# Patient Record
Sex: FEMALE | Race: WHITE
[De-identification: names, ages, dates, MRNs, and addresses within clinical notes are randomized per-mention and may not be internally consistent; named-entity substitution may affect disease eponyms.]

---

## 2018-03-13 ENCOUNTER — HOSPITAL ENCOUNTER (EMERGENCY)
Dept: HOSPITAL 52 - LL.ED | Age: 83
Discharge: NURSING, REHAB OR LONG-TERM CARE | End: 2018-03-13
Payer: MEDICARE

## 2018-03-13 VITALS — DIASTOLIC BLOOD PRESSURE: 79 MMHG | SYSTOLIC BLOOD PRESSURE: 135 MMHG

## 2018-03-13 DIAGNOSIS — M54.9: ICD-10-CM

## 2018-03-13 DIAGNOSIS — Z88.8: ICD-10-CM

## 2018-03-13 DIAGNOSIS — N18.9: ICD-10-CM

## 2018-03-13 DIAGNOSIS — S40.022A: Primary | ICD-10-CM

## 2018-03-13 DIAGNOSIS — J44.9: ICD-10-CM
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
## 2018-03-13 DIAGNOSIS — M25.552: ICD-10-CM

## 2018-03-13 DIAGNOSIS — I25.2: ICD-10-CM

## 2018-03-13 DIAGNOSIS — I50.9: ICD-10-CM

## 2018-03-13 DIAGNOSIS — I13.0: ICD-10-CM

## 2018-03-13 DIAGNOSIS — Z79.899: ICD-10-CM

## 2018-03-13 PROCEDURE — 99284 EMERGENCY DEPT VISIT MOD MDM: CPT

## 2018-03-13 PROCEDURE — 72070 X-RAY EXAM THORAC SPINE 2VWS: CPT

## 2018-03-13 PROCEDURE — 72100 X-RAY EXAM L-S SPINE 2/3 VWS: CPT

## 2018-03-13 PROCEDURE — 73502 X-RAY EXAM HIP UNI 2-3 VIEWS: CPT

## 2018-03-13 NOTE — EDM.PDOC
ED HPI GENERAL MEDICAL PROBLEM





- General


Chief Complaint: General


Stated Complaint: Fall with low back pain


Time Seen by Provider: 03/13/18 14:44


Source of Information: Reports: Patient, Other ([place])


History Limitations: Reports: No Limitations





- History of Present Illness


INITIAL COMMENTS - FREE TEXT/NARRATIVE: 





Patient comes to ER with complaint of mid back pain and left hip discomfort 

after falling onto buttocks this morning while picking out her clothes for the 

day.  Complained of developing a sensation of dizziness which led to the fall.  

Points to lower thoracic/upper lumbar region as area in question. Did not hit 

head. Has small bruise left elbow. No LOC. No other reported new complaints at 

this time. 





History of chronic vertigo. 





No focal acute neuro changes.


No fevers/chills


Denies HEENT changes/CV/Resp/GI/ changes. 


  ** Mid-Back


Pain Score (Numeric/FACES): 10





- Related Data


 Allergies











Allergy/AdvReac Type Severity Reaction Status Date / Time


 


enoxaparin sodium Allergy  Bleeding Verified 08/22/16 14:38





[From Lovenox]     


 


heparin Allergy  Bleeding Verified 08/22/16 14:38


 


NSAIDS (Non-Steroidal Allergy  Cannot Verified 08/22/16 14:38





Anti-Inflamma   Remember  


 


tetracycline [Tetracycline] Allergy  Fainting Verified 08/22/16 14:38











Home Meds: 


 Home Meds





Allopurinol [Zyloprim] 100 mg PO DAILY 05/19/14 [History]


Calcitriol [Rocaltrol] 0.25 mcg PO ASDIRECTED 05/19/14 [History]


Citalopram Hydrobromide [Celexa] 10 mg PO DAILY 05/19/14 [History]


LORazepam [Ativan] 0.25 mg PO BID 05/19/14 [History]


Mirtazapine [Remeron] 15 mg PO BEDTIME 05/19/14 [History]


Nitroglycerin [Nitrostat] 0.4 mg SL ASDIRECTED 05/19/14 [History]


Potassium Chloride [Klor-Con M20] 20 meq PO DAILY 05/19/14 [History]


Simvastatin [Zocor] 40 mg PO BEDTIME 05/19/14 [History]


Acetaminophen [Tylenol] 650 mg PO Q4HR PRN 08/22/16 [History]


Acetaminophen with Codeine [Acetaminophen-Cod #3] 1 tab PO BEDTIME MDD 3000 mg 

Tylenol, 360 mg Codein 08/22/16 [History]


Acetaminophen with Codeine [Acetaminophen-Cod #3] 1 tab PO Q4HR PRN MDD 3000 

Tylenol, 360 codiene 08/22/16 [History]


Bisacodyl [Biscolax] 10 mg RC DAILY PRN 08/22/16 [History]


Bumetanide [Bumex] 1 mg PO BID 08/22/16 [History]


Gabapentin [Neurontin] 100 mg PO BID 08/22/16 [History]


Magnesium Hydroxide [Milk of Magnesia] 30 ml PO DAILY PRN 08/22/16 [History]


Menthol [Cough Drops] 5.4 mg MM ASDIRECTED PRN 08/22/16 [History]


Metoprolol Succinate [Toprol XL] 12.5 mg PO DAILY 08/22/16 [History]


Propylene Glycol/Peg 400 [Systane 0.3-0.4% Eye Drops] 2 drop EYEBOTH QID 08/22/ 16 [History]


Saliva Stimulant Agents Comb.3 [Biotene Moisturizing Mouth Spray] 2 spray MM 

ASDIRECTED PRN 08/22/16 [History]


Sennosides/Docusate Sodium [Senna S Tablet] 1 each PO BID PRN 08/22/16 [History]


Albuterol Sulfate 0.63 mg IH QID PRN 03/13/18 [History]


Albuterol [Proair HFA] 2 puff INH Q4HR PRN 03/13/18 [History]


Calcium Carbonate [Tums] 2 tab PO ASDIRECTED PRN MDD 10 03/13/18 [History]


Cholecalciferol (Vitamin D3) [Vitamin D3] 1,000 unit PO DAILY 03/13/18 [History]


Fluticasone Furoate [Arnuity Ellipta] 200 mcg IH DAILY 03/13/18 [History]


Loperamide HCl [Imodium A-D] 2 mg PO Q3HR PRN MDD 8 03/13/18 [History]


Oxybutynin [Oxybutynin  ER] 10 mg PO DAILY 03/13/18 [History]


Umeclidinium Bromide [Incruse Ellipta] 1 puff INH DAILY 03/13/18 [History]


traMADol HCl [Tramadol HCl] 50 mg PO Q6H PRN #28 tablet 03/13/18 [Rx]











Past Medical History


HEENT History: Reports: Cataract, Other (See Below)


Other HEENT History: Chronic vertigo, bilateral cataract surgery as below


Cardiovascular History: Reports: Aneurysm, Bypass, CAD, Cardiomyopathy, Heart 

Failure, Heart Murmur, High Cholesterol, Hypertension, MI, Pulmonary 

Hypertension, PVD, Stents, Other (See Below)


Other Cardiovascular History: 2 previous aneurysms currently under observation 

protocol, severe ischemic cardiomyopathy with recurrent CHF, dyslipidemia, 

multiple previous non-STEMI MIs including on 10/27/10, December 2011, and 1/5/12

, multiple cardiac surgeries and AAA repair as below


Respiratory History: Reports: COPD


Gastrointestinal History: Reports: Cholelithiasis, Chronic Constipation, 

Gastritis, GI Bleed, PUD, Other (See Below)


Other Gastrointestinal History: Likely upper GI bleed diagnosed on 8/1/14, 

chronic dysphagia


Genitourinary History: Reports: Chronic Renal Insuffiency, UTI, Recurrent


OB/GYN History: Reports: Pregnancy


Musculoskeletal History: Reports: Fracture, Gout, Osteoarthritis, Osteoporosis, 

Other (See Below)


Other Musculoskeletal History: Previous finger fracture


Psychiatric History: Reports: Anxiety, Depression


Endocrine/Metabolic History: Reports: Hyperparathyroidism


Hematologic History: Reports: Anemia


Oncologic (Cancer) History: Reports: Basal Cell Carcinoma, Malignant Melanoma, 

Other (See Below)


Other Oncologic History: Melanoma of the nose and ears per [facility] chart 

however [facility] record showed these to be basal cell carcinomas, basal cell of 

the left chin and right Achilles tendon area with additional basal cell 

carcinoma of the right nose in 1999 and right ear in 2006





- Infectious Disease History


Infectious Disease History: Reports: C-Difficile, Chicken Pox, Measles, Mumps, 

Shingles





- Past Surgical History


HEENT Surgical History: Reports: Cataract Surgery, Tonsillectomy, Other (See 

Below)


Cardiovascular Surgical History: Reports: AAA Repair, Coronary Artery Bypass, 

Coronary Artery Stent, Other (See Below)


GI Surgical History: Reports: Cholecystectomy, ERCP, Other (See Below)


Musculoskeletal Surgical History: Reports: ORIF, Other (See Below)


Dermatological Surgical History: Reports: Skin Biopsy





- Past Imaging History


Past Imaging History: Reports: Cardiac Echo, CAT Scan





Social & Family History





- Family History


Cardiac: Reports: CAD, Hypertension, PVD/COD, Other (See Below)


Other Cardiac Family History: Heart disease in 2 brothers and 2 sisters, 

hypertension in father, 5 sisters and mother, sister with renal artery stenosis 

as below


Respiratory: Reports: COPD


: Reports: Renal Disease/Insufficiency, Other (See Below)


Other  Family History: Chronic renal disease in sister including renal artery 

stenosis


Neurological: Reports: CVA, Migraines, Other (See Below)


Other Neurological Family History: Migraine headaches in paternal grandfather, 

CVA in father


Psychiatric: Reports: Anxiety, Depression


Endocrine/Metabolic: Reports: Diabetes, type II, Other (See Below)


Other Endocrine/Metabolic Family History: Diabetes mellitus in son and mother


Oncologic: Reports: Brain, Renal, Other (See Below)


Other Oncologic Family History: Brother with renal cancer, granddaughter with 

brain cancer diagnosed as oligodendroglioma at age 19, unknown type of brain 

cancer in daughter in her 60s





- Tobacco Use


Smoking Status *Q: Never Smoker


Second Hand Smoke Exposure: Yes





- Caffeine Use


Caffeine Use: Reports: Coffee





- Alcohol Use


Days Per Week of Alcohol Use: 0





- Recreational Drug Use


Recreational Drug Use: No


Drug Use in Last 12 Months: No





- Living Situation & Occupation


Living situation: Reports: , Extended Care Facility





ED ROS GENERAL





- Review of Systems


Review Of Systems: ROS reveals no pertinent complaints other than HPI.





ED EXAM, GENERAL





- Physical Exam


Exam: See Below


Exam Limited By: No Limitations


General Appearance: Alert, WD/WN, No Apparent Distress


Eye Exam: Bilateral Eye: EOMI, PERRL


Ears: Normal External Exam


Nose: No: Nasal Deformity, Nasal Swelling, Nasal Drainage


Throat/Mouth: Normal Inspection, Normal Voice, No Airway Compromise


Head: Atraumatic, Normocephalic


Neck: Normal Inspection, Supple, Non-Tender, Full Range of Motion


Respiratory/Chest: No Respiratory Distress, Lungs Clear, Normal Breath Sounds, 

No Accessory Muscle Use, Chest Non-Tender


Cardiovascular: Normal Peripheral Pulses, Regular Rate, Rhythm


Peripheral Pulses: 2+: Radial (L), Radial (R)


GI/Abdominal: Normal Bowel Sounds, Soft, Non-Tender, No Distention


 (Female) Exam: Deferred


Rectal (Female) Exam: Deferred


Back Exam: Other (mild tenderness noted palpation of thoraco-lumbar junction 

and adjacent soft tissue on right. )


Extremities: Normal Range of Motion, No Pedal Edema, Normal Capillary Refill, 

Other (mild tenderness left lateral hip. No bruising noted. ).  No: Joint 

Swelling, Arm Pain


Neurological: Alert, Oriented, Normal Cognition, Other (equal  upper 

extremities. Equal strength lower extremities. Able to lift legs in air 

bilaterally without obvious discomfort. )


Psychiatric: Normal Affect, Normal Mood


Skin Exam: Warm, Dry, Intact, Ecchymosis (left elbow. )





Course





- Vital Signs


Last Recorded V/S: 


 Last Vital Signs











Temp  36.6 C   03/13/18 14:35


 


Pulse  75   03/13/18 14:35


 


Resp  18   03/13/18 14:35


 


BP  135/79   03/13/18 14:35


 


Pulse Ox  92 L  03/13/18 14:35














- Orders/Labs/Meds


Orders: 


 Active Orders 24 hr











 Category Date Time Status


 


 Hip Min 2V or 3V w Pelvis Lt [CR] Stat Exams  03/13/18 15:25 Taken


 


 Lumbar Spine 2 or 3V [CR] Stat Exams  03/13/18 14:44 Taken


 


 Thoracic Spine 2V [CR] Stat Exams  03/13/18 14:44 Taken











Meds: 


Medications














Discontinued Medications














Generic Name Dose Route Start Last Admin





  Trade Name Freq  PRN Reason Stop Dose Admin


 


Tramadol HCl  50 mg  03/13/18 14:45  03/13/18 15:23





  Ultram  PO  03/13/18 14:46  50 mg





  ONETIME ONE   Administration














- Radiology Interpretation


Free Text/Narrative:: 





Xrays of thoracic and lumbar spine ordered to look for compression fracture. 

Pelvis and hip films ordered. Radiology did not note any acute changes 

suggestive of new compression fracture or other acute bony injury. 





- Re-Assessments/Exams


Free Text/Narrative Re-Assessment/Exam: 





03/13/18 17:08


Patient able to stand with assist, had weight equally on both extremities. 

Overall appeared comfortable throughout stay.  Given Tramadol PO early on. 

Rated discomfort a "5" when standing.  





No obvious acute fracture noted. Conservative treatment plan at this time. Ice/

gentle activity/PRN Tramadol.  To watch for changes. To follow up with primary 

as needed if no improvement is noted within the next week or if there are 

sudden worsening changes.  Radiology recommended to consider MRI if additional 

imaging is thought to be required. 





Vital signs stable.  No other complaints during stay in ER. 





Departure





- Departure


Time of Disposition: 16:53


Disposition: DC/Tfer to [facility]


Condition: Good


Clinical Impression: 


 Left hip pain





Fall


Qualifiers:


 Encounter type: initial encounter Qualified Code(s): W19.XXXA - Unspecified 

fall, initial encounter





Back pain


Qualifiers:


 Back pain location: back pain in unspecified location Chronicity: acute Back 

pain laterality: midline Qualified Code(s): M54.9 - Dorsalgia, unspecified





Arm contusion


Qualifiers:


 Encounter type: initial encounter Laterality: left Qualified Code(s): S40.022A 

- Contusion of left upper arm, initial encounter








- Discharge Information


Prescriptions: 


traMADol HCl [Tramadol HCl] 50 mg PO Q6H PRN #28 tablet


 PRN Reason: Pain (Moderate 4-6)


Referrals: 


Miguelito Jean-Baptiste MD [Primary Care Provider] - 


Forms:  ED Department Discharge


Additional Instructions: 


Watch for changes.  If there are any concerns/worsening problems, or if pain is 

not improving within the next week, follow up with primary provider and 

consider obtaining MRI. Tramadol PRN pain. 





- My Orders


Last 24 Hours: 


My Active Orders





03/13/18 14:44


Lumbar Spine 2 or 3V [CR] Stat 


Thoracic Spine 2V [CR] Stat 





03/13/18 15:25


Hip Min 2V or 3V w Pelvis Lt [CR] Stat 














- Assessment/Plan


Last 24 Hours: 


My Active Orders





03/13/18 14:44


Lumbar Spine 2 or 3V [CR] Stat 


Thoracic Spine 2V [CR] Stat 





03/13/18 15:25


Hip Min 2V or 3V w Pelvis Lt [CR] Stat